# Patient Record
Sex: FEMALE | Race: WHITE | NOT HISPANIC OR LATINO | URBAN - METROPOLITAN AREA
[De-identification: names, ages, dates, MRNs, and addresses within clinical notes are randomized per-mention and may not be internally consistent; named-entity substitution may affect disease eponyms.]

---
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
## 2025-03-10 ENCOUNTER — OFFICE VISIT (OUTPATIENT)
Dept: URGENT CARE | Facility: PHYSICIAN GROUP | Age: 20
End: 2025-03-10
Payer: COMMERCIAL

## 2025-03-10 ENCOUNTER — APPOINTMENT (OUTPATIENT)
Dept: RADIOLOGY | Facility: IMAGING CENTER | Age: 20
End: 2025-03-10
Attending: NURSE PRACTITIONER
Payer: COMMERCIAL

## 2025-03-10 VITALS
HEART RATE: 104 BPM | DIASTOLIC BLOOD PRESSURE: 78 MMHG | WEIGHT: 293 LBS | RESPIRATION RATE: 20 BRPM | BODY MASS INDEX: 57.52 KG/M2 | TEMPERATURE: 98.3 F | SYSTOLIC BLOOD PRESSURE: 126 MMHG | HEIGHT: 60 IN | OXYGEN SATURATION: 98 %

## 2025-03-10 DIAGNOSIS — M77.8 TENDINITIS OF RIGHT WRIST: ICD-10-CM

## 2025-03-10 DIAGNOSIS — M25.531 RIGHT WRIST PAIN: ICD-10-CM

## 2025-03-10 PROCEDURE — 3078F DIAST BP <80 MM HG: CPT | Performed by: NURSE PRACTITIONER

## 2025-03-10 PROCEDURE — 3074F SYST BP LT 130 MM HG: CPT | Performed by: NURSE PRACTITIONER

## 2025-03-10 PROCEDURE — 99203 OFFICE O/P NEW LOW 30 MIN: CPT | Performed by: NURSE PRACTITIONER

## 2025-03-10 PROCEDURE — 73110 X-RAY EXAM OF WRIST: CPT | Mod: TC,RT | Performed by: RADIOLOGY

## 2025-03-10 RX ORDER — IBUPROFEN 800 MG/1
800 TABLET, FILM COATED ORAL EVERY 8 HOURS PRN
Qty: 30 TABLET | Refills: 0 | Status: SHIPPED | OUTPATIENT
Start: 2025-03-10

## 2025-03-10 ASSESSMENT — VISUAL ACUITY: OU: 1

## 2025-03-10 ASSESSMENT — ENCOUNTER SYMPTOMS: CONSTITUTIONAL NEGATIVE: 1

## 2025-03-11 NOTE — PROGRESS NOTES
Subjective:     Madhuri Wells is a 19 y.o. female who presents for Wrist Pain (Right wrist,x3 days)       Wrist Injury   There was no injury mechanism.     Patient reports after work, she started to experience right wrist pain.  Denies work-related injury.  Symptoms gradually worsening over the past 2 days.  Reports unable to flex or extend her right wrist due to pain and stiffness.  Also reports tenderness mainly on the volar aspect which extends to her distal right forearm volar aspect.    Able to make a fist without problems.  Reports some numbness of her fingers.    Has not taken medication.  Denies prior chronic wrist problem.    Reports she is left-hand dominant.  As part of her job, does sign language and moves around furniture.    Review of Systems   Constitutional: Negative.    Musculoskeletal:         Right wrist pain   All other systems reviewed and are negative.    Refer to HPI for additional details.    During this visit, appropriate PPE was worn, and hand hygiene was performed.    PMH:  has a past medical history of Anxiety, Depression, and Head ache.    MEDS:   Current Outpatient Medications:     ibuprofen (MOTRIN) 800 MG Tab, Take 1 Tablet by mouth every 8 hours as needed for Moderate Pain or Inflammation., Disp: 30 Tablet, Rfl: 0    ALLERGIES: No Known Allergies  SURGHX:   Past Surgical History:   Procedure Laterality Date    RECTUS REPAIR  3/19/2009    Performed by MERCED RENEE at SURGERY SAME DAY [facility]    EYE SURGERY      lazy eye     SOCHX:  reports that she has never smoked. She has never used smokeless tobacco. She reports that she does not drink alcohol and does not use drugs.    FH: Per HPI as applicable/pertinent.      Objective:     /78 (BP Location: Right arm, Patient Position: Sitting, BP Cuff Size: Large adult)   Pulse (!) 104   Temp 36.8 °C (98.3 °F) (Temporal)   Resp 20   Ht 1.524 m (5')   Wt (!) 133 kg (293 lb)   SpO2 98%   BMI 57.22 kg/m²     Physical  Exam  Nursing note reviewed.   Constitutional:       General: She is not in acute distress.     Appearance: She is well-developed. She is not ill-appearing or toxic-appearing.   Eyes:      General: Vision grossly intact.   Cardiovascular:      Rate and Rhythm: Normal rate.      Pulses: Normal pulses.   Pulmonary:      Effort: Pulmonary effort is normal. No respiratory distress.   Musculoskeletal:         General: No deformity.      Right elbow: Normal.      Right forearm: Tenderness (Distal flexors) present.      Right wrist: Tenderness (Radial aspect, volar > dorsal) present. No swelling, deformity or lacerations. Decreased range of motion. Normal pulse.      Right hand: No swelling, deformity or tenderness. Normal range of motion. Normal strength.      Comments: Negative right Finkelstein; negative Tinel and Phalen sign   Skin:     General: Skin is warm and dry.      Coloration: Skin is not pale.      Findings: No bruising, erythema or rash.   Neurological:      Mental Status: She is alert and oriented to person, place, and time.      Motor: No weakness.   Psychiatric:         Behavior: Behavior normal. Behavior is cooperative.     XR R Wrist:    Details    Reading Physician Reading Date Result Priority   Adeel Castellanos M.D.  [phone]     3/10/2025      Narrative & Impression     3/10/2025 6:05 PM     HISTORY/REASON FOR EXAM:  Right wrist pain.     TECHNIQUE/EXAM DESCRIPTION AND NUMBER OF VIEWS:  4 views of the RIGHT wrist.     COMPARISON:     FINDINGS:  Bone mineralization is normal. There is no evidence of fracture or dislocation. Soft tissues are normal.     IMPRESSION:     No evidence of acute fracture or dislocation.        Exam Ended: 03/10/25  6:16 PM Last Resulted: 03/10/25  6:21 PM     Radiology report and images reviewed by myself.    Assessment/Plan:     1. Right wrist pain  - DX-WRIST-COMPLETE 3+ RIGHT; Future  - ibuprofen (MOTRIN) 800 MG Tab; Take 1 Tablet by mouth every 8 hours as needed for  Moderate Pain or Inflammation.  Dispense: 30 Tablet; Refill: 0    2. Tendinitis of right wrist  - ibuprofen (MOTRIN) 800 MG Tab; Take 1 Tablet by mouth every 8 hours as needed for Moderate Pain or Inflammation.  Dispense: 30 Tablet; Refill: 0    Probable overuse strain of the right wrist.    Rest, ice, compression, and elevation (RICE). Wear wrist brace (applied here). Rx as above sent electronically.    Monitor. Follow up in 7 days if symptoms do not improve or sooner if symptoms change or worsen. Warning signs reviewed. Immediate return precautions advised.     Differential diagnosis, natural history, supportive care, over-the-counter symptom management per 's instructions, close monitoring, and indications for immediate follow-up discussed.     All questions answered. Patient agrees with the plan of care.